# Patient Record
Sex: FEMALE | Race: BLACK OR AFRICAN AMERICAN
[De-identification: names, ages, dates, MRNs, and addresses within clinical notes are randomized per-mention and may not be internally consistent; named-entity substitution may affect disease eponyms.]

---

## 2019-09-04 ENCOUNTER — HOSPITAL ENCOUNTER (OUTPATIENT)
Dept: HOSPITAL 19 - COL.RAD | Age: 69
End: 2019-09-04
Attending: PHYSICIAN ASSISTANT
Payer: MEDICARE

## 2019-09-04 DIAGNOSIS — R06.02: ICD-10-CM

## 2019-09-04 DIAGNOSIS — R14.1: Primary | ICD-10-CM

## 2019-09-04 PROCEDURE — A9541 TC99M SULFUR COLLOID: HCPCS

## 2019-11-15 ENCOUNTER — HOSPITAL ENCOUNTER (OUTPATIENT)
Dept: HOSPITAL 19 - COL.PUL | Age: 69
End: 2019-11-15
Attending: INTERNAL MEDICINE
Payer: MEDICARE

## 2019-11-15 DIAGNOSIS — R06.02: Primary | ICD-10-CM

## 2020-01-07 ENCOUNTER — HOSPITAL ENCOUNTER (OUTPATIENT)
Dept: HOSPITAL 19 - COL.PUL | Age: 70
End: 2020-01-07
Attending: INTERNAL MEDICINE
Payer: MEDICARE

## 2020-01-07 DIAGNOSIS — R06.02: Primary | ICD-10-CM

## 2022-09-09 ENCOUNTER — HOSPITAL ENCOUNTER (OUTPATIENT)
Dept: HOSPITAL 19 - SDCO | Age: 72
End: 2022-09-09
Attending: ORTHOPAEDIC SURGERY
Payer: MEDICARE

## 2022-09-09 VITALS — BODY MASS INDEX: 32.21 KG/M2 | HEIGHT: 62 IN | WEIGHT: 175.05 LBS

## 2022-09-09 VITALS — HEART RATE: 51 BPM | SYSTOLIC BLOOD PRESSURE: 126 MMHG | DIASTOLIC BLOOD PRESSURE: 63 MMHG

## 2022-09-09 VITALS — HEART RATE: 49 BPM | SYSTOLIC BLOOD PRESSURE: 141 MMHG | DIASTOLIC BLOOD PRESSURE: 67 MMHG

## 2022-09-09 VITALS — SYSTOLIC BLOOD PRESSURE: 132 MMHG | HEART RATE: 58 BPM | DIASTOLIC BLOOD PRESSURE: 57 MMHG

## 2022-09-09 VITALS — SYSTOLIC BLOOD PRESSURE: 141 MMHG | DIASTOLIC BLOOD PRESSURE: 70 MMHG | HEART RATE: 55 BPM | TEMPERATURE: 97.7 F

## 2022-09-09 VITALS — DIASTOLIC BLOOD PRESSURE: 74 MMHG | HEART RATE: 51 BPM | SYSTOLIC BLOOD PRESSURE: 156 MMHG

## 2022-09-09 DIAGNOSIS — M65.331: Primary | ICD-10-CM

## 2022-09-09 NOTE — NUR
Patient dismissal instructions given and voices understanding of home cares
and follow up appointment.  Provided office number for any questions or
concerns.

## 2022-09-09 NOTE — NUR
Continues to rest without complaints of pain or nausea.  Right hand kept
elevated.  Fingers warm and pink.

## 2022-09-09 NOTE — NUR
Patient returns to room 6 per cart accompanied by Hoang CHRISTOPHER and Adriel ALEXIS.
Patient is awake and alert.  Temp 97.9 and room air sats 100%.   Dressing on
the right hand clean and dry.  Siderails up x2 and call light in reach.
Taking sips of water.  Denies pain or nausea.

## 2022-09-09 NOTE — NUR
Patient discharged to home driven by family member per private vehicle and
taken to car per wheelchair and assisted in vehicle with instructions in hand.